# Patient Record
Sex: MALE | Race: WHITE | NOT HISPANIC OR LATINO | Employment: FULL TIME | URBAN - METROPOLITAN AREA
[De-identification: names, ages, dates, MRNs, and addresses within clinical notes are randomized per-mention and may not be internally consistent; named-entity substitution may affect disease eponyms.]

---

## 2021-07-22 ENCOUNTER — CLINICAL SUPPORT (OUTPATIENT)
Dept: URGENT CARE | Facility: CLINIC | Age: 61
End: 2021-07-22
Payer: COMMERCIAL

## 2021-07-22 DIAGNOSIS — Z11.9 SCREENING EXAMINATION FOR UNSPECIFIED INFECTIOUS DISEASE: Primary | ICD-10-CM

## 2021-07-22 LAB
CTP QC/QA: YES
SARS-COV-2 RDRP RESP QL NAA+PROBE: NEGATIVE

## 2021-07-22 PROCEDURE — U0002 COVID-19 LAB TEST NON-CDC: HCPCS | Mod: QW,S$GLB,, | Performed by: FAMILY MEDICINE

## 2021-07-22 PROCEDURE — U0002: ICD-10-PCS | Mod: QW,S$GLB,, | Performed by: FAMILY MEDICINE

## 2021-07-22 PROCEDURE — 99211 OFF/OP EST MAY X REQ PHY/QHP: CPT | Mod: S$GLB,,, | Performed by: PHYSICIAN ASSISTANT

## 2021-07-22 PROCEDURE — 99211 PR OFFICE/OUTPT VISIT, EST, LEVL I: ICD-10-PCS | Mod: S$GLB,,, | Performed by: PHYSICIAN ASSISTANT

## 2021-07-23 PROBLEM — I25.10 CORONARY ATHEROSCLEROSIS OF NATIVE CORONARY ARTERY: Status: ACTIVE | Noted: 2021-07-23

## 2022-06-24 ENCOUNTER — HOSPITAL ENCOUNTER (EMERGENCY)
Facility: OTHER | Age: 62
Discharge: HOME | End: 2022-06-24
Attending: EMERGENCY MEDICINE
Payer: COMMERCIAL

## 2022-06-24 VITALS
OXYGEN SATURATION: 96 % | DIASTOLIC BLOOD PRESSURE: 83 MMHG | TEMPERATURE: 98 F | SYSTOLIC BLOOD PRESSURE: 171 MMHG | HEART RATE: 96 BPM | RESPIRATION RATE: 17 BRPM

## 2022-06-24 DIAGNOSIS — L03.116 CELLULITIS OF LEFT LEG: Primary | ICD-10-CM

## 2022-06-24 PROCEDURE — 90715 TDAP VACCINE 7 YRS/> IM: CPT | Performed by: STUDENT IN AN ORGANIZED HEALTH CARE EDUCATION/TRAINING PROGRAM

## 2022-06-24 PROCEDURE — 99284 EMERGENCY DEPT VISIT MOD MDM: CPT | Mod: 25

## 2022-06-24 PROCEDURE — 63600175 PHARM REV CODE 636 W HCPCS: Performed by: STUDENT IN AN ORGANIZED HEALTH CARE EDUCATION/TRAINING PROGRAM

## 2022-06-24 PROCEDURE — 90471 IMMUNIZATION ADMIN: CPT | Performed by: STUDENT IN AN ORGANIZED HEALTH CARE EDUCATION/TRAINING PROGRAM

## 2022-06-24 RX ORDER — CEPHALEXIN 500 MG/1
500 CAPSULE ORAL 4 TIMES DAILY
Qty: 20 CAPSULE | Refills: 0 | Status: SHIPPED | OUTPATIENT
Start: 2022-06-24 | End: 2022-06-27 | Stop reason: SDUPTHER

## 2022-06-24 RX ORDER — SULFAMETHOXAZOLE AND TRIMETHOPRIM 800; 160 MG/1; MG/1
1 TABLET ORAL 2 TIMES DAILY
Qty: 14 TABLET | Refills: 0 | Status: SHIPPED | OUTPATIENT
Start: 2022-06-24 | End: 2022-07-01

## 2022-06-24 RX ADMIN — CLOSTRIDIUM TETANI TOXOID ANTIGEN (FORMALDEHYDE INACTIVATED), CORYNEBACTERIUM DIPHTHERIAE TOXOID ANTIGEN (FORMALDEHYDE INACTIVATED), BORDETELLA PERTUSSIS TOXOID ANTIGEN (GLUTARALDEHYDE INACTIVATED), BORDETELLA PERTUSSIS FILAMENTOUS HEMAGGLUTININ ANTIGEN (FORMALDEHYDE INACTIVATED), BORDETELLA PERTUSSIS PERTACTIN ANTIGEN, AND BORDETELLA PERTUSSIS FIMBRIAE 2/3 ANTIGEN 0.5 ML: 5; 2; 2.5; 5; 3; 5 INJECTION, SUSPENSION INTRAMUSCULAR at 05:06

## 2022-06-24 NOTE — ED PROVIDER NOTES
"Encounter Date: 6/24/2022       History     Chief Complaint   Patient presents with    Wound Check     Left lower leg redness and swelling, painful x 1 day. Hx of cellulitis     60yo M with PMH HTN, CAD s/p PCI on plavix presenting to ED with complaint of pain at left lower leg. 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 Last night, he began to notice redness and mild pain on his left shin. He reports the pain has increased and is more pronounced when he bends his left leg. He is able to ambulate normally. He reports mild pain relief with Tylenol. He denies fevers, chills, purulence, drainage or any other associated symptoms. He cannot recall when he last received a tetanus vaccination.         Review of patient's allergies indicates:   Allergen Reactions    Morphine Hallucinations     Past Medical History:   Diagnosis Date    Anticoagulant long-term use     Coronary artery disease     Hypertension     Seizures     as a child    Wound of toenail 07/16/2021    pt had pallatte fall on foot, has healing wound to great toe where heamtoma was drained.     Past Surgical History:   Procedure Laterality Date    ANKLE SURGERY Left     CORONARY ANGIOPLASTY WITH STENT PLACEMENT      x3     Family History   Problem Relation Age of Onset    Cancer Father     Graves' disease Sister      Social History     Tobacco Use    Smoking status: Never Smoker    Smokeless tobacco: Never Used   Substance Use Topics    Alcohol use: No    Drug use: No     Review of Systems   Constitutional: Negative for chills and fever.   HENT: Negative for congestion and rhinorrhea.    Eyes: Negative for pain and visual disturbance.   Respiratory: Negative for cough and shortness of breath.    Cardiovascular: Negative for chest pain and palpitations.   Gastrointestinal: Negative for abdominal pain, nausea and vomiting.   Genitourinary: Negative for difficulty " urinating and dysuria.   Musculoskeletal: Negative for gait problem and joint swelling.   Skin: Positive for wound. Negative for rash.   Neurological: Negative for weakness, numbness and headaches.       Physical Exam     Initial Vitals [06/24/22 1521]   BP Pulse Resp Temp SpO2   (!) 171/83 96 17 98.2 °F (36.8 °C) 96 %      MAP       --         Physical Exam    Nursing note and vitals reviewed.  Constitutional: He is not diaphoretic. No distress.   HENT:   Head: Normocephalic and atraumatic.   Mouth/Throat: Oropharynx is clear and moist.   Eyes: Conjunctivae and EOM are normal.   Neck: Neck supple.   Normal range of motion.  Cardiovascular: Normal rate, regular rhythm, normal heart sounds and intact distal pulses.   2+ palpable L DP pulse.    Pulmonary/Chest: Breath sounds normal. He has no wheezes. He has no rhonchi. He has no rales.   Abdominal: Abdomen is soft. He exhibits no distension. There is no abdominal tenderness.   Musculoskeletal:         General: Normal range of motion.      Cervical back: Normal range of motion and neck supple.     Neurological: He is alert and oriented to person, place, and time. He has normal strength. No sensory deficit.   Skin: Skin is warm and dry. Capillary refill takes less than 2 seconds.   4x4cm focal warm region of erythema with swelling at proximal anterior tibial region. No fluctuance or induration. No visible abrasions. No obvious extension into joint space of knee.         ED Course   Procedures  Labs Reviewed - No data to display       Imaging Results    None          Medications   Tdap vaccine injection 0.5 mL (has no administration in time range)     Medical Decision Making:   History:   Old Medical Records: I decided to obtain old medical records.  Initial Assessment:   62yo M with PMH HTN, CAD s/p PCI on plavix presenting to ED with complaint of pain at left lower leg.   Differential Diagnosis:   Cellulitis  Abscess  Fracture  ED Management:  Patient is  hemodynamically stable. He took Tylenol prior to arrival and reports that his pain is currently managed. Tdap given in ED. Beside ultrasound revealed cobblestoning with no drainable fluid pocket, most suggestive of nonpurulent cellulitis. Lack of inciting trauma and normal ambulation reduces suspicion for bone fracture. He does not have any known risk factors for MRSA. Patient will be given Keflex for 5-day course of treatment. He will also be given a prescription for Bactrim with instructions to start if his symptoms do not improve over the next two days. Patient discharged home with return precautions. All questions answered.                       Clinical Impression:   Final diagnoses:  [L03.116] Cellulitis of left leg (Primary)          ED Disposition Condition    Discharge Stable        ED Prescriptions     Medication Sig Dispense Start Date End Date Auth. Provider    cephALEXin (KEFLEX) 500 MG capsule Take 1 capsule (500 mg total) by mouth 4 (four) times daily. for 5 days 20 capsule 6/24/2022 6/29/2022 Leonela Vargas MD    sulfamethoxazole-trimethoprim 800-160mg (BACTRIM DS) 800-160 mg Tab Take 1 tablet by mouth 2 (two) times daily. for 7 days 14 tablet 6/24/2022 7/1/2022 Leonela Vargas MD        Follow-up Information     Follow up With Specialties Details Why Contact Info    Helio Lee MD Internal Medicine   [address] [place] [id number]  [phone]             Leonela Vargas MD  Resident  06/24/22 3320

## 2022-06-24 NOTE — ED TRIAGE NOTES
Patient presents to ED with c/o non traumatic redness to L lower leg since yesterday. He denies fever or any drainage from the site.

## 2022-06-24 NOTE — FIRST PROVIDER EVALUATION
Emergency Department TeleTriage Encounter Note      CHIEF COMPLAINT    Chief Complaint   Patient presents with    Wound Check     Left lower leg redness and swelling, painful x 1 day. Hx of cellulitis       VITAL SIGNS   Initial Vitals [06/24/22 1521]   BP Pulse Resp Temp SpO2   (!) 171/83 96 17 98.2 °F (36.8 °C) 96 %      MAP       --            ALLERGIES    Review of patient's allergies indicates:   Allergen Reactions    Morphine Hallucinations       PROVIDER TRIAGE NOTE  This is a teletriage evaluation of a 61 y.o. male presenting to the ED complaining of left lower leg redness and swelling since last night. Pmhx of cellulitis.  No recent trauma.  Denies fever.     Initial orders will be placed and care will be transferred to an alternate provider when patient is roomed for a full evaluation. Any additional orders and the final disposition will be determined by that provider.           ORDERS  Labs Reviewed - No data to display    ED Orders (720h ago, onward)    None            Virtual Visit Note: The provider triage portion of this emergency department evaluation and documentation was performed via [employer], a HIPAA-compliant telemedicine application, in concert with a tele-presenter in the room. A face to face patient evaluation with one of my colleagues will occur once the patient is placed in an emergency department room.      DISCLAIMER: This note was prepared with [employer] voice recognition transcription software. Garbled syntax, mangled pronouns, and other bizarre constructions may be attributed to that software system.

## 2022-06-24 NOTE — DISCHARGE INSTRUCTIONS
Diagnosis:   1. Cellulitis and abscess of left leg        Home Care Instructions:  - Medications: Take Keflex (antibiotic) 500mg four times daily (approximately every 6 hours) for 5 days.  **If swelling and redness does not improve after 2 days, start taking Bactrim (additional antibiotic).       Follow-Up Plan:  - Follow-up with: Primary care doctor within 5 days  - Additional testing and/or evaluation will be directed by your primary doctor    Return to the Emergency Department for symptoms including but not limited to: worsening symptoms, severe back pain, shortness of breath or chest pain, vomiting with inability to hold down fluids, blood in vomit or poop, fevers greater than 100.4°F, passing out/fainting/unconsciousness, or other concerning symptoms.

## 2023-02-04 ENCOUNTER — OFFICE VISIT (OUTPATIENT)
Dept: URGENT CARE | Facility: CLINIC | Age: 63
End: 2023-02-04
Payer: COMMERCIAL

## 2023-02-04 DIAGNOSIS — Z53.20 PROCEDURE NOT CARRIED OUT BECAUSE OF PATIENT'S DECISION: Primary | ICD-10-CM

## 2023-02-04 PROCEDURE — 99499 NO LOS: ICD-10-PCS | Mod: S$GLB,,, | Performed by: EMERGENCY MEDICINE

## 2023-02-04 PROCEDURE — 99499 UNLISTED E&M SERVICE: CPT | Mod: S$GLB,,, | Performed by: EMERGENCY MEDICINE

## 2023-02-04 NOTE — PROGRESS NOTES
Patient presents to urgent care today for right thumb pain.  Patient states he was lifting up on a pallet at work and was stuck with a splinter on the pallet and is now having pain in the thumb joint.   Patient was told that he would need to contact his boss to have work comp claim approval to be seen for the work related injury.